# Patient Record
Sex: MALE | Race: WHITE | URBAN - METROPOLITAN AREA
[De-identification: names, ages, dates, MRNs, and addresses within clinical notes are randomized per-mention and may not be internally consistent; named-entity substitution may affect disease eponyms.]

---

## 2023-07-10 NOTE — IMPRESSION/PLAN
Impression: Foreign body in cornea, left eye, initial encounter: T15.02xA. Plan: metallic FB x 1 week. Removed with [place] brush without complication. Discussed scar formation. Deferred BCL today. Discussed eye protection. Start Ofloxacin TID OS only Start Erythromycin QS OS only (sample given in office) RTC on Friday or 1 week. Call stat if pain or vision worsens.

## 2023-07-14 NOTE — IMPRESSION/PLAN
Impression: Foreign body in cornea, left eye, subsequent encounter: T15.02XD. Plan: metallic FB x 1 week. Removed with [place] brush without complication last visit. Non-central scar, no ED, symptoms resolved. OK to d/c erythromycin sanchez and ofloxacin. RTC PRN.  Discussed eye protection

## 2024-09-17 ENCOUNTER — OFFICE VISIT (OUTPATIENT)
Dept: URBAN - METROPOLITAN AREA CLINIC 56 | Facility: LOCATION | Age: 59
End: 2024-09-17
Payer: COMMERCIAL

## 2024-09-17 DIAGNOSIS — H11.053 PERIPHERAL PTERYGIUM, PROGRESSIVE, BILATERAL: Primary | ICD-10-CM

## 2024-09-17 DIAGNOSIS — H17.9 CORNEAL SCAR: ICD-10-CM

## 2024-09-17 PROCEDURE — 92014 COMPRE OPH EXAM EST PT 1/>: CPT | Performed by: STUDENT IN AN ORGANIZED HEALTH CARE EDUCATION/TRAINING PROGRAM

## 2024-09-17 PROCEDURE — 92134 CPTRZ OPH DX IMG PST SGM RTA: CPT | Performed by: STUDENT IN AN ORGANIZED HEALTH CARE EDUCATION/TRAINING PROGRAM

## 2024-09-17 ASSESSMENT — INTRAOCULAR PRESSURE
OS: 16
OD: 16

## 2024-09-17 ASSESSMENT — VISUAL ACUITY: OD: 20/20

## 2024-09-17 ASSESSMENT — KERATOMETRY
OD: 43.10
OS: 42.94